# Patient Record
Sex: MALE | Race: WHITE | NOT HISPANIC OR LATINO | Employment: UNEMPLOYED | URBAN - METROPOLITAN AREA
[De-identification: names, ages, dates, MRNs, and addresses within clinical notes are randomized per-mention and may not be internally consistent; named-entity substitution may affect disease eponyms.]

---

## 2023-10-02 ENCOUNTER — OFFICE VISIT (OUTPATIENT)
Dept: HEMATOLOGY/ONCOLOGY | Facility: HOSPITAL | Age: 58
End: 2023-10-02
Payer: MEDICAID

## 2023-10-02 VITALS
SYSTOLIC BLOOD PRESSURE: 117 MMHG | RESPIRATION RATE: 18 BRPM | HEART RATE: 59 BPM | DIASTOLIC BLOOD PRESSURE: 73 MMHG | HEIGHT: 68 IN | WEIGHT: 167.33 LBS | TEMPERATURE: 97.9 F | BODY MASS INDEX: 25.36 KG/M2 | OXYGEN SATURATION: 99 %

## 2023-10-02 DIAGNOSIS — C34.32 MALIGNANT NEOPLASM OF LOWER LOBE OF LEFT LUNG (MULTI): Primary | ICD-10-CM

## 2023-10-02 PROCEDURE — 1036F TOBACCO NON-USER: CPT | Performed by: INTERNAL MEDICINE

## 2023-10-02 PROCEDURE — 99205 OFFICE O/P NEW HI 60 MIN: CPT | Performed by: INTERNAL MEDICINE

## 2023-10-02 PROCEDURE — 99215 OFFICE O/P EST HI 40 MIN: CPT | Performed by: INTERNAL MEDICINE

## 2023-10-06 ENCOUNTER — TELEPHONE (OUTPATIENT)
Dept: HEMATOLOGY/ONCOLOGY | Facility: HOSPITAL | Age: 58
End: 2023-10-06
Payer: MEDICAID

## 2023-10-06 NOTE — TELEPHONE ENCOUNTER
Patient inquiring if Dr. Kong had a chance to meet with the tumor board this week. He would like to discuss findings and Plan of care.

## 2023-10-10 ENCOUNTER — TELEPHONE (OUTPATIENT)
Dept: HEMATOLOGY/ONCOLOGY | Facility: HOSPITAL | Age: 58
End: 2023-10-10
Payer: MEDICAID

## 2023-10-10 DIAGNOSIS — C34.90 MALIGNANT NEOPLASM OF LUNG, UNSPECIFIED LATERALITY, UNSPECIFIED PART OF LUNG (MULTI): ICD-10-CM

## 2023-10-10 NOTE — TELEPHONE ENCOUNTER
Attempted to call patient back, voicemail box full. Per Dr. Kong, phone visit requested for tomorrow, 10/11 at 12pm to discuss plan of care and tumor board results.

## 2023-10-15 ASSESSMENT — ENCOUNTER SYMPTOMS
ANOREXIA: 0
VOMITING: 0
WEAKNESS: 0
FATIGUE: 0
ABDOMINAL PAIN: 0
DIAPHORESIS: 0
FEVER: 0
CHILLS: 0
SWOLLEN GLANDS: 0
HEADACHES: 0
VISUAL CHANGE: 0
VERTIGO: 0
CHANGE IN BOWEL HABIT: 0
SORE THROAT: 0
COUGH: 0
NECK PAIN: 0
ARTHRALGIAS: 0
NUMBNESS: 0
JOINT SWELLING: 0
NAUSEA: 0
MYALGIAS: 0

## 2023-10-15 NOTE — PROGRESS NOTES
DIAGNOSIS    Adenocarcinoma of the lung.  Date of diagnosis April 2016 from a left surgical removal      STAGING    1-in 2016 staging a pathologic T2a (tumor measuring 2.5 cm) N0 M0, stage Ib      CURRENT SITES OF DISEASE    Left lower lobe s/p definitive resection in 2016,  Pulmonary nodules mainly on the right side, no histology yet obtained.      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 Rice is a 57-year-old gentleman with no significant prior tobacco exposure who in 2016 went to [place] and had an acute abdominal pain.  He went to the emergency room when he landed in [place] and his CT scan of the abdomen and pelvis looking for kidney stone showed an incidental left lower lobe nodule.  He was found to have a urinary tract infection and that was treated.  PET scan showed an FDG avid left lower lobe lesion with no adenopathy. There was an incidental esophageal uptake which was found to be benign on upper endoscopy.  The patient underwent a left lower lobe lobectomy on January 1, 2016 without complication that showed a 2.5 cm adenocarcinoma of the lung with negative margins and negative lymph nodes.  He follows up with pulmonary medicine.  CT scan without contrast of the chest on November 7, 2022 shows bilateral pulmonary nodules measuring up to 7 mm.  CT scan without contrast of the chest on November 7, 2022 shows bilateral pulmonary nodules measuring up to 7 mm in diameter. There are new and enlarging pulmonary nodules since the prior CT scan in March 2022. Right upper lobe 7 mm pulmonary nodule was previously 4 mm. New since May 2021. 5 mm right upper  lung nodule was previously 2 mm. 3 mm anterior right lower lobe nodule new since 2022. 4 mm left upper lung nodule new since 2022. 5 mm inferior left lower lobe nodule along the left hemidiaphragm is likely new. No adenopathy is noted outside of a 7 mm subcarinal lymph node which is stable.    The patient was seen by Dr. Stewart from pulmonary medicine at [facility] on 2022. Recommendation was for 3-month follow-up imaging. These were new and enlarging pulmonary nodules since prior CT scan on 2022.      PAST MEDICAL HISTORY    1-prostate cancer, [place] 6, diagnosed 2015, clinical T1 cNX M0  2- Sarcoidosis, not active  3- Dyslipidemia  4- A Fib   5-left knee arthroscopy  6-hernia repair      SOCIAL HISTORY    Patient has never smoked, worked as a contractor in [employer]      CURRENT MEDS    See medication list, meds reviewed      ALLERGIES    Reviewed      FAMILY HISTORY    Maternal grandfather had lung cancer and  at the age of 84.  Maternal grandmother had stomach cancer at the age of 91.      Patient ID: Jc Rice is a 58 y.o. male.  Referring Physician: No referring provider defined for this encounter.  Primary Care Provider: No primary care provider on file.      Subjective    Cancer  Pertinent negatives include no abdominal pain, anorexia, arthralgias, change in bowel habit, chest pain, chills, congestion, coughing, diaphoresis, fatigue, fever, headaches, joint swelling, myalgias, nausea, neck pain, numbness, rash, sore throat, swollen glands, urinary symptoms, vertigo, visual change, vomiting or weakness.       Review of Systems   Constitutional:  Negative for chills, diaphoresis, fatigue and fever.   HENT:   Negative for congestion and sore throat.    Respiratory:  Negative for cough.    Cardiovascular:  Negative for chest pain.   Gastrointestinal:  Negative for abdominal pain, anorexia, change in bowel habit, nausea and vomiting.   Musculoskeletal:   "Negative for arthralgias, joint swelling, myalgias and neck pain.   Skin:  Negative for rash.   Neurological:  Negative for headaches, numbness, vertigo and weakness.        Objective   BSA: 1.91 meters squared  /73 (BP Location: Left arm, Patient Position: Sitting, BP Cuff Size: Adult)   Pulse 59   Temp 36.6 °C (97.9 °F)   Resp 18   Ht 1.725 m (5' 7.91\")   Wt 75.9 kg (167 lb 5.3 oz)   SpO2 99%   BMI 25.51 kg/m²     No family history on file.  Oncology History    No history exists.       Jc Rice  reports that he has never smoked. He has never used smokeless tobacco.  He  reports that he does not currently use alcohol.  He  reports no history of drug use.    Physical Exam  Vitals reviewed.   Constitutional:       Appearance: Normal appearance. He is normal weight.   HENT:      Head: Normocephalic.      Nose: Nose normal.      Mouth/Throat:      Mouth: Mucous membranes are moist.      Pharynx: Oropharynx is clear.   Eyes:      Conjunctiva/sclera: Conjunctivae normal.      Pupils: Pupils are equal, round, and reactive to light.   Cardiovascular:      Rate and Rhythm: Normal rate and regular rhythm.      Pulses: Normal pulses.      Heart sounds: Normal heart sounds.   Pulmonary:      Effort: Pulmonary effort is normal.      Breath sounds: Normal breath sounds.   Abdominal:      General: Abdomen is flat. Bowel sounds are normal.   Musculoskeletal:         General: Normal range of motion.      Cervical back: Normal range of motion.   Skin:     General: Skin is warm.   Neurological:      General: No focal deficit present.      Mental Status: He is alert.   Psychiatric:         Mood and Affect: Mood normal.         Behavior: Behavior normal.         Performance Status:  Asymptomatic    Assessment/Plan      This is a gentleman with stage I resected adenocarcinoma of the lung dating back to early 2016, per notes within EGFR insertion mutation in exon 20.  In early 2022 he developed some pulmonary nodules, " multiple which have been slowly enlarging.  It took us some time to be able to get his images from the [facility] but ultimately they were arrived and I reviewed them.  I agree that nodules have been increasing and the largest one within the right lung measures now nearly 1 cm.  Although he has a history of sarcoidosis, this is not active and given the progressive enlarging of the nodules, malignancy needs to be considered.  I discussed the imaging findings with thoracic surgery who concur.  I recommend potential thorascopic removal of 1 of these nodules on the right side for diagnosis.                   Chevy Kong MD

## 2023-10-17 ENCOUNTER — TELEPHONE (OUTPATIENT)
Dept: HEMATOLOGY/ONCOLOGY | Facility: HOSPITAL | Age: 58
End: 2023-10-17
Payer: MEDICAID

## 2023-10-17 NOTE — TELEPHONE ENCOUNTER
Scheduling orders were placed last week for 10/11 and was never scheduled. Message sent to team and to scheduling leadership to address. Patient will need to be called to confirm the appointment date/time.

## 2023-10-18 ENCOUNTER — TELEPHONE (OUTPATIENT)
Dept: HEMATOLOGY/ONCOLOGY | Facility: HOSPITAL | Age: 58
End: 2023-10-18
Payer: MEDICAID

## 2023-10-18 DIAGNOSIS — C34.90 MALIGNANT NEOPLASM OF LUNG, UNSPECIFIED LATERALITY, UNSPECIFIED PART OF LUNG (MULTI): Primary | ICD-10-CM

## 2023-10-25 ENCOUNTER — OFFICE VISIT (OUTPATIENT)
Dept: SURGERY | Facility: HOSPITAL | Age: 58
End: 2023-10-25
Payer: MEDICAID

## 2023-10-25 VITALS
DIASTOLIC BLOOD PRESSURE: 70 MMHG | TEMPERATURE: 97.3 F | HEART RATE: 64 BPM | WEIGHT: 172.4 LBS | BODY MASS INDEX: 26.28 KG/M2 | SYSTOLIC BLOOD PRESSURE: 138 MMHG | RESPIRATION RATE: 16 BRPM | OXYGEN SATURATION: 99 %

## 2023-10-25 DIAGNOSIS — C34.90 MALIGNANT NEOPLASM OF LUNG, UNSPECIFIED LATERALITY, UNSPECIFIED PART OF LUNG (MULTI): ICD-10-CM

## 2023-10-25 PROCEDURE — 99215 OFFICE O/P EST HI 40 MIN: CPT | Performed by: THORACIC SURGERY (CARDIOTHORACIC VASCULAR SURGERY)

## 2023-10-25 PROCEDURE — 99205 OFFICE O/P NEW HI 60 MIN: CPT | Performed by: THORACIC SURGERY (CARDIOTHORACIC VASCULAR SURGERY)

## 2023-10-25 PROCEDURE — 1036F TOBACCO NON-USER: CPT | Performed by: THORACIC SURGERY (CARDIOTHORACIC VASCULAR SURGERY)

## 2023-10-25 RX ORDER — OMEPRAZOLE 40 MG/1
40 CAPSULE, DELAYED RELEASE ORAL
COMMUNITY
Start: 2023-02-02 | End: 2024-06-28

## 2023-10-25 ASSESSMENT — PATIENT HEALTH QUESTIONNAIRE - PHQ9
SUM OF ALL RESPONSES TO PHQ9 QUESTIONS 1 AND 2: 0
2. FEELING DOWN, DEPRESSED OR HOPELESS: NOT AT ALL
1. LITTLE INTEREST OR PLEASURE IN DOING THINGS: NOT AT ALL

## 2023-10-25 ASSESSMENT — COLUMBIA-SUICIDE SEVERITY RATING SCALE - C-SSRS
1. IN THE PAST MONTH, HAVE YOU WISHED YOU WERE DEAD OR WISHED YOU COULD GO TO SLEEP AND NOT WAKE UP?: NO
2. HAVE YOU ACTUALLY HAD ANY THOUGHTS OF KILLING YOURSELF?: NO
6. HAVE YOU EVER DONE ANYTHING, STARTED TO DO ANYTHING, OR PREPARED TO DO ANYTHING TO END YOUR LIFE?: NO

## 2023-10-25 ASSESSMENT — PAIN SCALES - GENERAL: PAINLEVEL: 0-NO PAIN

## 2023-10-25 NOTE — PROGRESS NOTES
HPI:   Jc Rice is a 58 y.o.male referred with Lung nodules.  He has a past medical history significant for left lower lobectomy for lung cancer in 2016 history of atrial fibrillation and sarcoidosis who was seen to have bilateral lung nodules.  He has no symptoms related to these nodules.  He denies any weight loss or change in breathing.    PMHx: per HPI  PSHx:  SHx: never smoker, denies ETOH, or illicit drugs   FMHx: negative for history of cancer and cardiac disease    ROS  General: negative for fever, chills, weight loss, night sweat  Head: negative for severe headache, vision change, blurred vision,   CV: negative for chest pain, dizziness, lightheadedness   Pulm: negative for shortness of breath, dyspnea on exertion, hemoptysis  GI: negative for diarrhea, constipation, abdominal pain, nausea or vomiting, BRBPR  : negative for dysuria, hematuria, incontinence  Skin: negative for rash  Heme: negative for blood thinner, bleeding disorder or clotting disorder  Endo: negative for heat or cold intolerance, weight gain or weight loss  MSK: negative for rash, edema, weakness    PHYSICAL EXAM  Constitution: well-developed well-nourished female lying in bed in no acute distress  HEENT: NCAT, moist mucosal membrane, neck supple, no crepitus, sclera anicteric  Lymph nodes: no cervical or supraclavicular lymphadenopathy  Cardiac: RRR, normal S1, S2, no mrg  Pulmonary: normal air movement, CTAP, no wcr  Abdomen: soft, non-distended, non-tender, no rigidity, guarding or rebound tenderness, no splenohepatomegaly  Neuro: AOx3, CNII-XII grossly normal  Ext: warm, dry, no edema noted  Skin: dry, clean and intact  Psych: mood and affect wnl    Results    I reviewed his PFT from January 3 2023. It showed FEV1 percent predicted and DLCO  70 percent predicted   I reviewed the CT scan from 8/11/23 . It showed bilateral lung nodules.      Assessment and Plan:    This is a 58M with a history of lobectomy for lung cancer and  sarcoid who has suspicious lung nodules. I have recommended a right vats excisional lung biopsy.  I explained alternatives of needle biopsy or observation.     I had a khahn and candid discussion with the patient. I discussed the risks that can occur with any general anesthetic or surgery including bleeding requiring a blood transfusion, infection, blood clots, irregular heart beats, heart attack, stroke, and other potential life threatening complications. Additionally I described complications that can be seen with this procedure including but not limited to delayed lung healing   I explained the alternatives of  needle biopsy or observation. All of the patients questions were answered and the patient consented to bronchoscopy and right VATS excisional lung biopsy.       Seamus Herron MD  Chief Division of Thoracic and Esophageal Surgery    [facility]   Co-Director, Thoracic Oncology Program    [facility]  Professor of Surgery    [facility] [employer] [employer]  Office phone: [phone]  Fax: [phone]

## 2023-11-02 ENCOUNTER — HOSPITAL ENCOUNTER (OUTPATIENT)
Facility: HOSPITAL | Age: 58
Setting detail: SURGERY ADMIT
End: 2023-11-02
Attending: THORACIC SURGERY (CARDIOTHORACIC VASCULAR SURGERY) | Admitting: THORACIC SURGERY (CARDIOTHORACIC VASCULAR SURGERY)
Payer: MEDICAID

## 2023-11-02 PROBLEM — C34.32 MALIGNANT NEOPLASM OF LOWER LOBE OF LEFT LUNG (MULTI): Status: ACTIVE | Noted: 2023-10-18

## 2024-01-14 ENCOUNTER — APPOINTMENT (OUTPATIENT)
Dept: CARDIOLOGY | Facility: HOSPITAL | Age: 59
End: 2024-01-14
Payer: MEDICAID

## 2024-01-14 ENCOUNTER — APPOINTMENT (OUTPATIENT)
Dept: RADIOLOGY | Facility: HOSPITAL | Age: 59
End: 2024-01-14
Payer: MEDICAID

## 2024-01-14 ENCOUNTER — HOSPITAL ENCOUNTER (EMERGENCY)
Facility: HOSPITAL | Age: 59
Discharge: HOME | End: 2024-01-14
Attending: STUDENT IN AN ORGANIZED HEALTH CARE EDUCATION/TRAINING PROGRAM
Payer: MEDICAID

## 2024-01-14 VITALS
HEIGHT: 69 IN | OXYGEN SATURATION: 95 % | DIASTOLIC BLOOD PRESSURE: 84 MMHG | TEMPERATURE: 99.1 F | BODY MASS INDEX: 24.44 KG/M2 | SYSTOLIC BLOOD PRESSURE: 125 MMHG | WEIGHT: 165 LBS | RESPIRATION RATE: 16 BRPM | HEART RATE: 97 BPM

## 2024-01-14 DIAGNOSIS — T78.40XA ALLERGIC REACTION, INITIAL ENCOUNTER: ICD-10-CM

## 2024-01-14 DIAGNOSIS — R00.2 PALPITATIONS: Primary | ICD-10-CM

## 2024-01-14 LAB
ALBUMIN SERPL BCP-MCNC: 4.2 G/DL (ref 3.4–5)
ALP SERPL-CCNC: 74 U/L (ref 33–136)
ALT SERPL W P-5'-P-CCNC: 22 U/L (ref 10–52)
ANION GAP SERPL CALC-SCNC: 12 MMOL/L (ref 10–20)
APPEARANCE UR: CLEAR
AST SERPL W P-5'-P-CCNC: 21 U/L (ref 9–39)
BASOPHILS # BLD AUTO: 0.02 X10*3/UL (ref 0–0.1)
BASOPHILS NFR BLD AUTO: 0.3 %
BILIRUB SERPL-MCNC: 0.9 MG/DL (ref 0–1.2)
BILIRUB UR STRIP.AUTO-MCNC: NEGATIVE MG/DL
BUN SERPL-MCNC: 7 MG/DL (ref 6–23)
CALCIUM SERPL-MCNC: 8.7 MG/DL (ref 8.6–10.3)
CARDIAC TROPONIN I PNL SERPL HS: 3 NG/L (ref 0–20)
CARDIAC TROPONIN I PNL SERPL HS: 3 NG/L (ref 0–20)
CHLORIDE SERPL-SCNC: 103 MMOL/L (ref 98–107)
CO2 SERPL-SCNC: 22 MMOL/L (ref 21–32)
COLOR UR: YELLOW
CREAT SERPL-MCNC: 0.84 MG/DL (ref 0.5–1.3)
EGFRCR SERPLBLD CKD-EPI 2021: >90 ML/MIN/1.73M*2
EOSINOPHIL # BLD AUTO: 0 X10*3/UL (ref 0–0.7)
EOSINOPHIL NFR BLD AUTO: 0 %
ERYTHROCYTE [DISTWIDTH] IN BLOOD BY AUTOMATED COUNT: 13.8 % (ref 11.5–14.5)
FLUAV RNA RESP QL NAA+PROBE: NOT DETECTED
FLUBV RNA RESP QL NAA+PROBE: NOT DETECTED
GLUCOSE SERPL-MCNC: 124 MG/DL (ref 74–99)
GLUCOSE UR STRIP.AUTO-MCNC: NEGATIVE MG/DL
HCT VFR BLD AUTO: 40.1 % (ref 41–52)
HGB BLD-MCNC: 14.3 G/DL (ref 13.5–17.5)
IMM GRANULOCYTES # BLD AUTO: 0.04 X10*3/UL (ref 0–0.7)
IMM GRANULOCYTES NFR BLD AUTO: 0.5 % (ref 0–0.9)
KETONES UR STRIP.AUTO-MCNC: ABNORMAL MG/DL
LEUKOCYTE ESTERASE UR QL STRIP.AUTO: NEGATIVE
LYMPHOCYTES # BLD AUTO: 0.43 X10*3/UL (ref 1.2–4.8)
LYMPHOCYTES NFR BLD AUTO: 5.5 %
MAGNESIUM SERPL-MCNC: 1.66 MG/DL (ref 1.6–2.4)
MCH RBC QN AUTO: 31.9 PG (ref 26–34)
MCHC RBC AUTO-ENTMCNC: 35.7 G/DL (ref 32–36)
MCV RBC AUTO: 90 FL (ref 80–100)
MONOCYTES # BLD AUTO: 0.33 X10*3/UL (ref 0.1–1)
MONOCYTES NFR BLD AUTO: 4.2 %
NEUTROPHILS # BLD AUTO: 7 X10*3/UL (ref 1.2–7.7)
NEUTROPHILS NFR BLD AUTO: 89.5 %
NITRITE UR QL STRIP.AUTO: NEGATIVE
NRBC BLD-RTO: 0 /100 WBCS (ref 0–0)
PH UR STRIP.AUTO: 8 [PH]
PLATELET # BLD AUTO: 203 X10*3/UL (ref 150–450)
POTASSIUM SERPL-SCNC: 3.8 MMOL/L (ref 3.5–5.3)
PROT SERPL-MCNC: 7.1 G/DL (ref 6.4–8.2)
PROT UR STRIP.AUTO-MCNC: NEGATIVE MG/DL
RBC # BLD AUTO: 4.48 X10*6/UL (ref 4.5–5.9)
RBC # UR STRIP.AUTO: NEGATIVE /UL
RSV RNA RESP QL NAA+PROBE: NOT DETECTED
SARS-COV-2 RNA RESP QL NAA+PROBE: NOT DETECTED
SODIUM SERPL-SCNC: 133 MMOL/L (ref 136–145)
SP GR UR STRIP.AUTO: 1.01
TSH SERPL-ACNC: 1.01 MIU/L (ref 0.44–3.98)
UROBILINOGEN UR STRIP.AUTO-MCNC: <2 MG/DL
WBC # BLD AUTO: 7.8 X10*3/UL (ref 4.4–11.3)

## 2024-01-14 PROCEDURE — 71275 CT ANGIOGRAPHY CHEST: CPT | Performed by: RADIOLOGY

## 2024-01-14 PROCEDURE — 83735 ASSAY OF MAGNESIUM: CPT | Performed by: STUDENT IN AN ORGANIZED HEALTH CARE EDUCATION/TRAINING PROGRAM

## 2024-01-14 PROCEDURE — 71045 X-RAY EXAM CHEST 1 VIEW: CPT | Performed by: RADIOLOGY

## 2024-01-14 PROCEDURE — 84443 ASSAY THYROID STIM HORMONE: CPT | Performed by: STUDENT IN AN ORGANIZED HEALTH CARE EDUCATION/TRAINING PROGRAM

## 2024-01-14 PROCEDURE — 84484 ASSAY OF TROPONIN QUANT: CPT | Performed by: STUDENT IN AN ORGANIZED HEALTH CARE EDUCATION/TRAINING PROGRAM

## 2024-01-14 PROCEDURE — 2550000001 HC RX 255 CONTRASTS: Performed by: STUDENT IN AN ORGANIZED HEALTH CARE EDUCATION/TRAINING PROGRAM

## 2024-01-14 PROCEDURE — 71275 CT ANGIOGRAPHY CHEST: CPT

## 2024-01-14 PROCEDURE — 85025 COMPLETE CBC W/AUTO DIFF WBC: CPT | Performed by: STUDENT IN AN ORGANIZED HEALTH CARE EDUCATION/TRAINING PROGRAM

## 2024-01-14 PROCEDURE — 2500000004 HC RX 250 GENERAL PHARMACY W/ HCPCS (ALT 636 FOR OP/ED): Performed by: STUDENT IN AN ORGANIZED HEALTH CARE EDUCATION/TRAINING PROGRAM

## 2024-01-14 PROCEDURE — 81003 URINALYSIS AUTO W/O SCOPE: CPT | Performed by: STUDENT IN AN ORGANIZED HEALTH CARE EDUCATION/TRAINING PROGRAM

## 2024-01-14 PROCEDURE — 80053 COMPREHEN METABOLIC PANEL: CPT | Performed by: STUDENT IN AN ORGANIZED HEALTH CARE EDUCATION/TRAINING PROGRAM

## 2024-01-14 PROCEDURE — 71045 X-RAY EXAM CHEST 1 VIEW: CPT

## 2024-01-14 PROCEDURE — 99284 EMERGENCY DEPT VISIT MOD MDM: CPT | Performed by: STUDENT IN AN ORGANIZED HEALTH CARE EDUCATION/TRAINING PROGRAM

## 2024-01-14 PROCEDURE — 36415 COLL VENOUS BLD VENIPUNCTURE: CPT | Performed by: STUDENT IN AN ORGANIZED HEALTH CARE EDUCATION/TRAINING PROGRAM

## 2024-01-14 PROCEDURE — 93005 ELECTROCARDIOGRAM TRACING: CPT

## 2024-01-14 PROCEDURE — 87637 SARSCOV2&INF A&B&RSV AMP PRB: CPT | Performed by: STUDENT IN AN ORGANIZED HEALTH CARE EDUCATION/TRAINING PROGRAM

## 2024-01-14 PROCEDURE — 96374 THER/PROPH/DIAG INJ IV PUSH: CPT | Performed by: STUDENT IN AN ORGANIZED HEALTH CARE EDUCATION/TRAINING PROGRAM

## 2024-01-14 PROCEDURE — 99285 EMERGENCY DEPT VISIT HI MDM: CPT | Mod: 25 | Performed by: STUDENT IN AN ORGANIZED HEALTH CARE EDUCATION/TRAINING PROGRAM

## 2024-01-14 RX ORDER — ACETAMINOPHEN 325 MG/1
650 TABLET ORAL ONCE
Status: COMPLETED | OUTPATIENT
Start: 2024-01-14 | End: 2024-01-14

## 2024-01-14 RX ORDER — DIPHENHYDRAMINE HYDROCHLORIDE 50 MG/ML
INJECTION INTRAMUSCULAR; INTRAVENOUS
Status: DISPENSED
Start: 2024-01-14 | End: 2024-01-14

## 2024-01-14 RX ORDER — ACETAMINOPHEN 325 MG/1
TABLET ORAL
Status: DISPENSED
Start: 2024-01-14 | End: 2024-01-14

## 2024-01-14 RX ORDER — EPINEPHRINE 0.3 MG/.3ML
1 INJECTION SUBCUTANEOUS ONCE AS NEEDED
Qty: 1 EACH | Refills: 0 | Status: SHIPPED | OUTPATIENT
Start: 2024-01-14

## 2024-01-14 RX ORDER — DIPHENHYDRAMINE HYDROCHLORIDE 50 MG/ML
50 INJECTION INTRAMUSCULAR; INTRAVENOUS ONCE
Status: COMPLETED | OUTPATIENT
Start: 2024-01-14 | End: 2024-01-14

## 2024-01-14 RX ADMIN — DIPHENHYDRAMINE HYDROCHLORIDE 50 MG: 50 INJECTION, SOLUTION INTRAMUSCULAR; INTRAVENOUS at 01:50

## 2024-01-14 RX ADMIN — IOHEXOL 75 ML: 350 INJECTION, SOLUTION INTRAVENOUS at 03:52

## 2024-01-14 RX ADMIN — ACETAMINOPHEN 650 MG: 325 TABLET ORAL at 05:05

## 2024-01-14 ASSESSMENT — PAIN DESCRIPTION - DESCRIPTORS
DESCRIPTORS: PRESSURE
DESCRIPTORS: PRESSURE

## 2024-01-14 ASSESSMENT — LIFESTYLE VARIABLES
EVER FELT BAD OR GUILTY ABOUT YOUR DRINKING: NO
HAVE YOU EVER FELT YOU SHOULD CUT DOWN ON YOUR DRINKING: NO
EVER HAD A DRINK FIRST THING IN THE MORNING TO STEADY YOUR NERVES TO GET RID OF A HANGOVER: NO
HAVE PEOPLE ANNOYED YOU BY CRITICIZING YOUR DRINKING: NO

## 2024-01-14 ASSESSMENT — PAIN - FUNCTIONAL ASSESSMENT
PAIN_FUNCTIONAL_ASSESSMENT: 0-10
PAIN_FUNCTIONAL_ASSESSMENT: 0-10

## 2024-01-14 ASSESSMENT — COLUMBIA-SUICIDE SEVERITY RATING SCALE - C-SSRS
6. HAVE YOU EVER DONE ANYTHING, STARTED TO DO ANYTHING, OR PREPARED TO DO ANYTHING TO END YOUR LIFE?: NO
2. HAVE YOU ACTUALLY HAD ANY THOUGHTS OF KILLING YOURSELF?: NO
1. IN THE PAST MONTH, HAVE YOU WISHED YOU WERE DEAD OR WISHED YOU COULD GO TO SLEEP AND NOT WAKE UP?: NO

## 2024-01-14 ASSESSMENT — PAIN DESCRIPTION - LOCATION
LOCATION: CHEST
LOCATION: HEAD

## 2024-01-14 ASSESSMENT — PAIN SCALES - GENERAL: PAINLEVEL_OUTOF10: 4

## 2024-01-14 NOTE — DISCHARGE INSTRUCTIONS
As discussed no evidence of atrial fibrillation today I recommend you keep your cardiology appointment for ablation.  Unclear etiology of your palpitations today.  We did discuss the metastatic lesions on your CAT scan which were already known.  Also unclear etiology why you have been having these intermittent allergic reactions over the course of the past year.  He did have formal allergy testing which was unremarkable.  There is no evidence of anaphylactic reaction today.  I will prescribe an epinephrine pen to be utilized if you feel you are having an anaphylactic reaction as discussed if this occurs you need to return to the emergency department immediately.  Also should you been experiencing chest pain fevers symptoms concerning to you return

## 2024-01-14 NOTE — ED PROVIDER NOTES
EMERGENCY DEPARTMENT ENCOUNTER      Pt Name: Jc Rice  MRN: [id number]  Birthdate 1965  Date of evaluation: 1/14/2024  Provider: Adonis Simons DO    CHIEF COMPLAINT       Chief Complaint   Patient presents with    Palpitations     Pt c/o racing heart, sob, swollen lips, dizziness, and chills since 5pm yesterday evening. Pt states he has a hx of prostate and lung cancer. Pt states he was told he has new tumors.        HISTORY OF PRESENT ILLNESS    Jc Rice is a 58 y.o. male who presents to the emergency department with EMS for palpitations as well as near syncope.  Patient also notes associated chills as well as a nonproductive cough.  Of note he does have active lung and prostate cancer with significant metastatic disease.  He states that he does have a history of atrial fibrillation not on anticoagulation it is intermittent.  He feels that his symptoms may be related to this today as he has had similar symptoms in the past.  No measured fevers at home.  Uncertain of any sick contacts.  He is not actively undergoing any chemo or radiation.  Denies any further associated symptoms at this time.  He also notes that he has been having some lip swelling no oral swelling this has been on and off for the past year he has seen an allergist in the past with formal testing never found a source.          Nursing Notes were reviewed.    REVIEW OF SYSTEMS     CONSTITUTIONAL: Endorses chills.  Denies fever, sweats.   NEURO: Denies difficulty walking, numbness, weakness, tingling, headache.   HEENT: Denies sore throat, rhinorrhea, changes in vision.   CARDIO: Endorses palpitations.  Denies chest pain.  PULM: Endorses cough.  Denies shortness of breath.   GI: Denies abdominal pain, nausea, vomiting, diarrhea, constipation, melena, hematochezia.  : Denies painful urination, frequency, hematuria.   MSK: Denies recent trauma.   SKIN: Denies rash, lesions.   ENDOCRINE: Denies unexpected weight-loss.   HEME: Denies  bleeding disorder.     PAST MEDICAL HISTORY     Past Medical History:   Diagnosis Date    Atrial fibrillation (CMS/HCC)     Hyperlipemia     Lung cancer (CMS/HCC)     Lung nodules     Sarcoidosis        SURGICAL HISTORY       Past Surgical History:   Procedure Laterality Date    HERNIA REPAIR      KNEE SURGERY      LUNG LOBECTOMY         ALLERGIES     Shellfish derived    FAMILY HISTORY     No family history on file.     SOCIAL HISTORY       Social History     Socioeconomic History    Marital status:      Spouse name: Not on file    Number of children: Not on file    Years of education: Not on file    Highest education level: Not on file   Occupational History    Not on file   Tobacco Use    Smoking status: Never    Smokeless tobacco: Never   Substance and Sexual Activity    Alcohol use: Not Currently    Drug use: Never    Sexual activity: Not on file   Other Topics Concern    Not on file   Social History Narrative    Not on file     Social Determinants of Health     Financial Resource Strain: Not on file   Food Insecurity: Not on file   Transportation Needs: Not on file   Physical Activity: Not on file   Stress: Not on file   Social Connections: Not on file   Intimate Partner Violence: Not on file   Housing Stability: Not on file       PHYSICAL EXAM   VS: As documented in the triage note from today's date and EMR flowsheet were reviewed.  Gen: Well developed. No acute distress. Seated in bed. Appears nontoxic.  Alert and oriented to person time place.  Skin: Warm. Dry. Intact. No rashes or lesions.  Eyes: Pupils equally round and reactive to light. Clear sclera. EOMI.  HENT: Atraumatic appearance. Mucosal membranes moist. No oral lesions, uvula midline, airway patent.  Very minimal swelling upper and lower lips no oral swelling no systemic rashes.  No meningismal signs.  No evidence of PTA or Althea's.  No tonsillar exudates.  CV: Regular rate and regular rhythm. S1, S2.  Trace bilateral pedal edema. Warm  extremities.  Resp: Nonlabored breathing Clear to auscultation bilaterally. No increased work of breathing.   GI: Soft and nontender. No rebound or guarding. Bowel sounds x4 present.  Beaza sign McBurney's point tenderness is negative no CVA tenderness.  MSK: Symmetric muscle bulk. No joint swelling in the extremities. Compartments are soft. Neurovascularly intact x4 extremities. Radial pulses +2 equal bilaterally.  Pedal pulses +2 equal bilaterally.  Neuro: Alert. CN II - XII intact. Speech fluent. Moving all extremities. No focal deficits. Gait normal.  Psych: Appropriate. Kempt.    DIAGNOSTIC RESULTS       RADIOLOGY:   Non-plain film images such as CT, Ultrasound and MRI are read by the radiologist. Plain radiographic images are visualized and preliminarily interpreted by the emergency physician with the below findings: Chest x-ray questionable pneumonia although patient afebrile will further evaluate with CT imaging.      Interpretation per the Radiologist below, if available at the time of this note:    CT angio chest for pulmonary embolism   Final Result   No pulmonary embolism or acute cardiopulmonary process.        Status post left lower lobectomy.        Ground-glass nodules measuring up to 10 mm throughout the lungs,   suspicious for metastatic disease.             MACRO:   None        Signed by: Jennifer Rich 1/14/2024 4:07 AM   Dictation workstation:   [id number]      XR chest 1 view   Final Result   Indeterminate left perihilar and basilar opacity is favored to be   artifactual. However, mass or pneumonia are not excluded on this   radiograph and further evaluation with CT should be considered. Mild   cardiomegaly.        MACRO:   None        Signed by: Jennifer Rich 1/14/2024 2:09 AM   Dictation workstation:   [id number]            ED BEDSIDE ULTRASOUND:   Performed by ED Physician - none    LABS:  Labs Reviewed   CBC WITH AUTO DIFFERENTIAL - Abnormal       Result Value    WBC 7.8      nRBC 0.0       RBC 4.48 (*)     Hemoglobin 14.3      Hematocrit 40.1 (*)     MCV 90      MCH 31.9      MCHC 35.7      RDW 13.8      Platelets 203      Neutrophils % 89.5      Immature Granulocytes %, Automated 0.5      Lymphocytes % 5.5      Monocytes % 4.2      Eosinophils % 0.0      Basophils % 0.3      Neutrophils Absolute 7.00      Immature Granulocytes Absolute, Automated 0.04      Lymphocytes Absolute 0.43 (*)     Monocytes Absolute 0.33      Eosinophils Absolute 0.00      Basophils Absolute 0.02     COMPREHENSIVE METABOLIC PANEL - Abnormal    Glucose 124 (*)     Sodium 133 (*)     Potassium 3.8      Chloride 103      Bicarbonate 22      Anion Gap 12      Urea Nitrogen 7      Creatinine 0.84      eGFR >90      Calcium 8.7      Albumin 4.2      Alkaline Phosphatase 74      Total Protein 7.1      AST 21      Bilirubin, Total 0.9      ALT 22     URINALYSIS WITH REFLEX MICROSCOPIC - Abnormal    Color, Urine Yellow      Appearance, Urine Clear      Specific Gravity, Urine 1.009      pH, Urine 8.0      Protein, Urine NEGATIVE      Glucose, Urine NEGATIVE      Blood, Urine NEGATIVE      Ketones, Urine 20 (1+) (*)     Bilirubin, Urine NEGATIVE      Urobilinogen, Urine <2.0      Nitrite, Urine NEGATIVE      Leukocyte Esterase, Urine NEGATIVE     MAGNESIUM - Normal    Magnesium 1.66     RSV PCR - Normal    RSV PCR Not Detected      Narrative:     This assay is an FDA-cleared, in vitro diagnostic nucleic acid amplification test for the detection of RSV from nasopharyngeal specimens, and has been validated for use at [facility]. Negative results do not preclude RSV infections, and should not be used as the sole basis for diagnosis, treatment, or other management decisions. If Influenza A/B and RSV PCR results are negative, testing for Parainfluenza virus, Adenovirus and Metapneumovirus is routinely performed for [place]ology and intensive care [place] [facility] [place] and is available on other patients by  placing an add-on request.       SARS-COV-2 AND INFLUENZA A/B PCR - Normal    Flu A Result Not Detected      Flu B Result Not Detected      Coronavirus 2019, PCR Not Detected      Narrative:     This assay has received FDA Emergency Use Authorization (EUA) and  is only authorized for the duration of time that circumstances exist to justify the authorization of the emergency use of in vitro diagnostic tests for the detection of SARS-CoV-2 virus and/or diagnosis of COVID-19 infection under section 564(b)(1) of the Act, 21 U.S.C. 360bbb-3(b)(1). Testing for SARS-CoV-2 is only recommended for patients who meet current clinical and/or epidemiological criteria as defined by federal, state, or local public health directives. This assay is an in vitro diagnostic nucleic acid amplification test for the qualitative detection of SARS-CoV-2, Influenza A, and Influenza B from nasopharyngeal specimens and has been validated for use at [facility]. Negative results do not preclude COVID-19 infections or Influenza A/B infections, and should not be used as the sole basis for diagnosis, treatment, or other management decisions. If Influenza A/B and RSV PCR results are negative, testing for Parainfluenza virus, Adenovirus and Metapneumovirus is routinely performed for [facility] [place] intensive [place] in[place], and is available on other patients by placing an add-on request.    TSH WITH REFLEX TO FREE T4 IF ABNORMAL - Normal    Thyroid Stimulating Hormone 1.01      Narrative:     TSH testing is performed using different testing methodology at [facility] than at other [facility]. Direct result comparisons should only be made within the same method.     SERIAL TROPONIN-INITIAL - Normal    Troponin I, High Sensitivity 3      Narrative:     Less than 99th percentile of normal range cutoff-  Female and children under 18 years old <14 ng/L; Male <21 ng/L: Negative  Repeat testing should  be performed if clinically indicated.     Female and children under 18 years old 14-50 ng/L; Male 21-50 ng/L:  Consistent with possible cardiac damage and possible increased clinical   risk. Serial measurements may help to assess extent of myocardial damage.     >50 ng/L: Consistent with cardiac damage, increased clinical risk and  myocardial infarction. Serial measurements may help assess extent of   myocardial damage.      NOTE: Children less than 1 year old may have higher baseline troponin   levels and results should be interpreted in conjunction with the overall   clinical context.     NOTE: Troponin I testing is performed using a different   testing methodology at [facility] than at other   [facility]. Direct result comparisons should only   be made within the same method.   SERIAL TROPONIN, 1 HOUR - Normal    Troponin I, High Sensitivity 3      Narrative:     Less than 99th percentile of normal range cutoff-  Female and children under 18 years old <14 ng/L; Male <21 ng/L: Negative  Repeat testing should be performed if clinically indicated.     Female and children under 18 years old 14-50 ng/L; Male 21-50 ng/L:  Consistent with possible cardiac damage and possible increased clinical   risk. Serial measurements may help to assess extent of myocardial damage.     >50 ng/L: Consistent with cardiac damage, increased clinical risk and  myocardial infarction. Serial measurements may help assess extent of   myocardial damage.      NOTE: Children less than 1 year old may have higher baseline troponin   levels and results should be interpreted in conjunction with the overall   clinical context.     NOTE: Troponin I testing is performed using a different   testing methodology at [facility] than at other   [facility]. Direct result comparisons should only   be made within the same method.   TROPONIN SERIES- (INITIAL, 1 HR)    Narrative:     The following orders were created for  "panel order Troponin I Series, High Sensitivity (0, 1 HR).  Procedure                               Abnormality         Status                     ---------                               -----------         ------                     Troponin I, High Sensiti...[[id number]]  Normal              Final result               Troponin, High Sensitivi...[[id number]]  Normal              Final result                 Please view results for these tests on the individual orders.       All other labs were within normal range or not returned as of this dictation.    EMERGENCY DEPARTMENT COURSE/MDM:   Vitals:    Vitals:    01/14/24 0123 01/14/24 0200 01/14/24 0400 01/14/24 0430   BP: 138/85 135/86 111/64 125/84   Pulse: 92 86 88 97   Resp: 20 20 20 16   Temp: 37.3 °C (99.1 °F)      SpO2: 99% 96% 95% 95%   Weight: 74.8 kg (165 lb)      Height: 1.753 m (5' 9\")          I reviewed the patient's triage vitals and they are hypertensive with systolics in the 130s recommended follow-up with primary physician for repeat checks.    Due to the above findings the following was ordered cardiac workup to include viral swabs as well as Benadryl for questionable swelling of bilateral lips.  Admits that he does have a history of allergy to shellfish although has not had any this evening.    Patient is in no acute distress has had no runs of arrhythmias on cardiac monitor while in the emergency department.  No leukocytosis making infectious etiology less likely.  No signs of anemia either.  Card troponin x 2 negative no acute injury pattern on EKG doubt atypical ACS.  TSH within normal range no significant electrolyte derangements renal function within normal range as well.  Viral swabs for flu COVID RSV are negative.  Urinalysis not consistent with UTI.  Chest x-ray with questionable pneumonia CTA was pursued due to metastatic disease and palpitations to rule out PE.  There is no evidence of PE multiple incidental findings as well as metastatic " lesions which are known to the patient all incidental findings were discussed with the patient.  He was reevaluated the lip swelling did resolve.  He has no rashes at this time.  No oral swelling.  I do not believe this was an anaphylactic reaction allergic is a possibility unknown etiology.  He already has scheduled follow-up with his cardiologist for potential ablation of his known intermittent atrial fibrillation this could be the cause of his palpitations although none were captured while in the emergency department.  He was sent with home-going epinephrine pen as needed recommendations for antihistamines for any recurrence if he is to use the epinephrine pen he is to return immediately.  He was agreeable with plan and appreciative of care discharged in stable condition.    ED Course as of 01/14/24 0632   Sun Jan 14, 2024   0133 Interpreted by the Emergency Department Attending: ECG revealed sinus tachycardia at a rate of 100 beats per minute with NH interval 152 , QRS of 94 , QTc of 469.  No acute injury pattern.  No previous EKG to compare. [MG]      ED Course User Index  [MG] Adonis Simons DO         Diagnoses as of 01/14/24 0632   Palpitations   Allergic reaction, initial encounter       Patient was counseled regarding labs, imaging, likely diagnosis, and plan. All questions were answered.     ------------------------------------------------------------------  Information provided by the patient  Past medical history complicating workup active metastatic disease.  Previous medical records reviewed previous office visit urology 12/6/2023.  ------------------------------------------------------------------  ED Medications administered this visit:    Medications   diphenhydrAMINE (BENADryl) injection 50 mg (50 mg intravenous Given 1/14/24 0150)   iohexol (OMNIPaque) 350 mg iodine/mL solution 75 mL (75 mL intravenous Given 1/14/24 0352)   acetaminophen (Tylenol) tablet 650 mg (650 mg oral Given 1/14/24  0505)       New Prescriptions from this visit:    Discharge Medication List as of 1/14/2024  4:45 AM        START taking these medications    Details   EPINEPHrine (Epipen) 0.3 mg/0.3 mL injection syringe Inject 0.3 mL (0.3 mg) into the muscle 1 time if needed for anaphylaxis for up to 1 dose. Inject into upper leg. Call 911 after use., Starting Sun 1/14/2024, Print             Follow-up:  [facility] Emergency Medicine  [address]  [facility] [address] [phone]  Go to   If symptoms worsen    Johnny Diana DO  [address] [facility] [address]  [phone]    Schedule an appointment as soon as possible for a visit           Final Impression:   1. Palpitations    2. Allergic reaction, initial encounter          Adonis Simons DO    (Please note that portions of this note were completed with a voice recognition program.  Efforts were made to edit the dictations but occasionally words are mis-transcribed.)     Adonis Simons DO  01/14/24 0609

## 2024-01-15 LAB
ATRIAL RATE: 100 BPM
P AXIS: 44 DEGREES
P OFFSET: 182 MS
P ONSET: 134 MS
PR INTERVAL: 152 MS
Q ONSET: 210 MS
QRS COUNT: 16 BEATS
QRS DURATION: 94 MS
QT INTERVAL: 364 MS
QTC CALCULATION(BAZETT): 469 MS
QTC FREDERICIA: 431 MS
R AXIS: -17 DEGREES
T AXIS: 18 DEGREES
T OFFSET: 392 MS
VENTRICULAR RATE: 100 BPM